# Patient Record
Sex: FEMALE | Race: BLACK OR AFRICAN AMERICAN | NOT HISPANIC OR LATINO | Employment: FULL TIME | URBAN - METROPOLITAN AREA
[De-identification: names, ages, dates, MRNs, and addresses within clinical notes are randomized per-mention and may not be internally consistent; named-entity substitution may affect disease eponyms.]

---

## 2017-07-06 LAB
CHOLESTEROL, TOTAL, FIBROMETER: 168
HDLC SERPL-MCNC: 40 MG/DL
LDLC SERPL CALC-MCNC: 101 MG/DL
TRIGL SERPL-MCNC: 133 MG/DL

## 2021-01-01 PROBLEM — R00.2 PALPITATIONS: Status: ACTIVE | Noted: 2021-01-01

## 2021-01-01 PROBLEM — I47.10 SVT (SUPRAVENTRICULAR TACHYCARDIA): Status: ACTIVE | Noted: 2021-01-01

## 2021-01-01 PROBLEM — I10 ESSENTIAL HYPERTENSION: Status: ACTIVE | Noted: 2021-01-01

## 2021-01-01 PROBLEM — I21.4 NSTEMI (NON-ST ELEVATED MYOCARDIAL INFARCTION): Status: ACTIVE | Noted: 2021-01-01

## 2021-01-01 PROBLEM — R07.9 CHEST PAIN: Status: ACTIVE | Noted: 2021-01-01

## 2021-01-02 PROBLEM — I47.10 SVT (SUPRAVENTRICULAR TACHYCARDIA): Status: RESOLVED | Noted: 2021-01-01 | Resolved: 2021-01-02

## 2021-01-07 PROBLEM — F41.9 ANXIETY: Status: ACTIVE | Noted: 2021-01-07

## 2021-02-11 PROBLEM — E66.01 CLASS 3 SEVERE OBESITY DUE TO EXCESS CALORIES WITH BODY MASS INDEX (BMI) OF 40.0 TO 44.9 IN ADULT: Status: ACTIVE | Noted: 2021-02-11

## 2021-04-09 PROBLEM — J01.10 SUBACUTE FRONTAL SINUSITIS: Status: ACTIVE | Noted: 2021-04-09

## 2021-04-09 PROBLEM — R05.8 UPPER AIRWAY COUGH SYNDROME: Status: ACTIVE | Noted: 2021-04-09

## 2021-04-09 PROBLEM — Z00.00 HEALTHCARE MAINTENANCE: Status: ACTIVE | Noted: 2021-04-09

## 2021-07-12 PROBLEM — Z00.00 HEALTHCARE MAINTENANCE: Status: RESOLVED | Noted: 2021-04-09 | Resolved: 2021-07-12

## 2021-07-12 PROBLEM — J01.10 SUBACUTE FRONTAL SINUSITIS: Status: RESOLVED | Noted: 2021-04-09 | Resolved: 2021-07-12

## 2021-08-17 ENCOUNTER — PATIENT OUTREACH (OUTPATIENT)
Dept: ADMINISTRATIVE | Facility: HOSPITAL | Age: 31
End: 2021-08-17

## 2021-12-29 PROBLEM — R06.83 SNORING: Status: ACTIVE | Noted: 2021-12-29

## 2022-07-19 DIAGNOSIS — U07.1 COVID-19 VIRUS DETECTED: ICD-10-CM

## 2023-02-14 PROBLEM — F41.0 PANIC ATTACKS: Status: ACTIVE | Noted: 2023-02-14

## 2023-02-14 PROBLEM — F41.1 GENERALIZED ANXIETY DISORDER: Status: ACTIVE | Noted: 2021-01-07

## 2023-08-10 ENCOUNTER — NURSE TRIAGE (OUTPATIENT)
Dept: ADMINISTRATIVE | Facility: CLINIC | Age: 33
End: 2023-08-10

## 2023-08-10 NOTE — TELEPHONE ENCOUNTER
Reason for Disposition   Patient wants to be seen    Additional Information   Negative: Sounds like a life-threatening emergency to the triager   Negative: Systolic BP >= 160 OR Diastolic >= 100, and any cardiac (e.g., breathing difficulty, chest pain) or neurologic symptoms (e.g., new-onset blurred or double vision)   Negative: Pregnant 20 or more weeks (or postpartum < 6 weeks) with new hand or face swelling   Negative: Pregnant 20 or more weeks (or postpartum < 6 weeks) and Systolic BP >= 160 OR Diastolic >= 110   Negative: Patient sounds very sick or weak to the triager   Negative: Systolic BP >= 200 OR Diastolic >= 120 and having NO cardiac or neurologic symptoms   Negative: Pregnant 20 or more weeks (or postpartum < 6 weeks) with Systolic BP >= 140 OR Diastolic >= 90   Negative: Systolic BP >= 180 OR Diastolic >= 110    Protocols used: Blood Pressure - High-A-OH  Spoke with pt who reports that she was seen in ED yesterday due to elevated BP. Denies HA, Chest pain, and SOB to triage nurse. States bp 136/90 taken 10 minute ago. Asking she is able to double up on hydrochlorothiazide medication. States she has appointment to be seen Monday but states would like to be seen today if possible. Call transferred to scheduling department. Advised pt if no appointments available can be seen in Ed/UC.

## 2023-11-14 ENCOUNTER — PATIENT MESSAGE (OUTPATIENT)
Dept: ADMINISTRATIVE | Facility: HOSPITAL | Age: 33
End: 2023-11-14